# Patient Record
Sex: FEMALE | Race: AMERICAN INDIAN OR ALASKA NATIVE
[De-identification: names, ages, dates, MRNs, and addresses within clinical notes are randomized per-mention and may not be internally consistent; named-entity substitution may affect disease eponyms.]

---

## 2018-06-28 ENCOUNTER — HOSPITAL ENCOUNTER (OUTPATIENT)
Dept: HOSPITAL 5 - 3A | Age: 33
Setting detail: OBSERVATION
LOS: 4 days | Discharge: HOME | End: 2018-07-02
Attending: OBSTETRICS & GYNECOLOGY | Admitting: OBSTETRICS & GYNECOLOGY
Payer: MEDICAID

## 2018-06-28 DIAGNOSIS — Z82.49: ICD-10-CM

## 2018-06-28 DIAGNOSIS — Z79.899: ICD-10-CM

## 2018-06-28 LAB
ALBUMIN SERPL-MCNC: 3.6 G/DL (ref 3.9–5)
ALT SERPL-CCNC: 20 UNITS/L (ref 7–56)
BASOPHILS # (AUTO): 0.1 K/MM3 (ref 0–0.1)
BASOPHILS NFR BLD AUTO: 0.8 % (ref 0–1.8)
BILIRUB UR QL STRIP: (no result)
BLOOD UR QL VISUAL: (no result)
BUN SERPL-MCNC: 10 MG/DL (ref 7–17)
BUN/CREAT SERPL: 13 %
CALCIUM SERPL-MCNC: 9 MG/DL (ref 8.4–10.2)
EOSINOPHIL # BLD AUTO: 0.1 K/MM3 (ref 0–0.4)
EOSINOPHIL NFR BLD AUTO: 1.3 % (ref 0–4.3)
HCT VFR BLD CALC: 34.6 % (ref 30.3–42.9)
HEMOLYSIS INDEX: 91
HGB BLD-MCNC: 10.7 GM/DL (ref 10.1–14.3)
LYMPHOCYTES # BLD AUTO: 2.2 K/MM3 (ref 1.2–5.4)
LYMPHOCYTES NFR BLD AUTO: 28.7 % (ref 13.4–35)
MCH RBC QN AUTO: 22 PG (ref 28–32)
MCHC RBC AUTO-ENTMCNC: 31 % (ref 30–34)
MCV RBC AUTO: 70 FL (ref 79–97)
MONOCYTES # (AUTO): 0.6 K/MM3 (ref 0–0.8)
MONOCYTES % (AUTO): 7.7 % (ref 0–7.3)
PH UR STRIP: 7 [PH] (ref 5–7)
PLATELET # BLD: 254 K/MM3 (ref 140–440)
PROT UR STRIP-MCNC: (no result) MG/DL
RBC # BLD AUTO: 4.97 M/MM3 (ref 3.65–5.03)
RBC #/AREA URNS HPF: 1 /HPF (ref 0–6)
UROBILINOGEN UR-MCNC: < 2 MG/DL (ref ?–2)
WBC #/AREA URNS HPF: 1 /HPF (ref 0–6)

## 2018-06-28 PROCEDURE — 36415 COLL VENOUS BLD VENIPUNCTURE: CPT

## 2018-06-28 PROCEDURE — 96361 HYDRATE IV INFUSION ADD-ON: CPT

## 2018-06-28 PROCEDURE — 93306 TTE W/DOPPLER COMPLETE: CPT

## 2018-06-28 PROCEDURE — 96366 THER/PROPH/DIAG IV INF ADDON: CPT

## 2018-06-28 PROCEDURE — 96365 THER/PROPH/DIAG IV INF INIT: CPT

## 2018-06-28 PROCEDURE — 83735 ASSAY OF MAGNESIUM: CPT

## 2018-06-28 PROCEDURE — 81001 URINALYSIS AUTO W/SCOPE: CPT

## 2018-06-28 PROCEDURE — G0378 HOSPITAL OBSERVATION PER HR: HCPCS

## 2018-06-28 PROCEDURE — 96375 TX/PRO/DX INJ NEW DRUG ADDON: CPT

## 2018-06-28 PROCEDURE — 96376 TX/PRO/DX INJ SAME DRUG ADON: CPT

## 2018-06-28 PROCEDURE — G0379 DIRECT REFER HOSPITAL OBSERV: HCPCS

## 2018-06-28 PROCEDURE — 80053 COMPREHEN METABOLIC PANEL: CPT

## 2018-06-28 PROCEDURE — 85025 COMPLETE CBC W/AUTO DIFF WBC: CPT

## 2018-06-28 RX ADMIN — LABETALOL HYDROCHLORIDE SCH MG: 200 TABLET, FILM COATED ORAL at 21:59

## 2018-06-28 RX ADMIN — DEXTROSE, SODIUM CHLORIDE, SODIUM LACTATE, POTASSIUM CHLORIDE, AND CALCIUM CHLORIDE SCH MLS/HR: 5; .6; .31; .03; .02 INJECTION, SOLUTION INTRAVENOUS at 15:03

## 2018-06-28 RX ADMIN — LABETALOL HYDROCHLORIDE SCH MG: 200 TABLET, FILM COATED ORAL at 14:34

## 2018-06-28 NOTE — HISTORY AND PHYSICAL REPORT
History of Present Illness


Date of examination: 18


Date of admission: 





18


Chief complaint: 





here for post op


History of present illness: 


Pt presented to office for post op f/u after c/s. She had bps that were 180s/

100s. Pt admitted for evaluation and treatment of her elevated blood pressures. 

No headaches and no blurry vision.





Past History


Past Medical History: no pertinent history


Past Surgical History:  section (x2)





- Obstetrical History


: 3


Para: 3


Number of Living Children: 4





Medications and Allergies


 Allergies











Allergy/AdvReac Type Severity Reaction Status Date / Time


 


No Known Allergies Allergy   Unverified 18 14:20











 Home Medications











 Medication  Instructions  Recorded  Confirmed  Last Taken  Type


 


Docusate Sodium [Colace] 100 mg PO BID PRN #30 capsule 18  Unknown Rx


 


Ferrous Sulfate 325 mg PO BID #60 tablet.dr 18  Unknown Rx


 


Ibuprofen 800 mg PO Q6HR #30 tablet 18  Unknown Rx


 


Lidocain2.5%/Prilocai2.5% [Emla] 2 gm TP ONCE #1 tube 18  Unknown Rx


 


oxyCODONE /ACETAMINOPHEN [Percocet 1 tab PO Q4HR #30 tab 18  Unknown Rx





5/325]     











Active Meds: 


Active Medications





Acetaminophen (Tylenol)  650 mg PO Q4H PRN


   PRN Reason: Pain MILD(1-3)/Fever >100.5/HA


Magnesium Sulfate (Magnesium Sulfate 40gm/1000ml)  40 gm in 1,000 mls @ 50 mls/

hr IV AS DIRECT OWEN


Ibuprofen (Motrin)  600 mg PO Q6H PRN


   PRN Reason: Pain, Mild (1-3)


Ondansetron HCl (Zofran)  4 mg IV Q8H PRN


   PRN Reason: Nausea And Vomiting


Sodium Chloride (Sodium Chloride Flush Syringe 10 Ml)  10 ml IV BID OWEN


Sodium Chloride (Sodium Chloride Flush Syringe 10 Ml)  10 ml IV PRN PRN


   PRN Reason: LINE FLUSH











Review of Systems


All systems: negative





- Physical Exam


Breasts: Positive: deferred


Cardiovascular: Normal S1, Normal S2


Lungs: Positive: Clear to auscultation, Normal air movement


Abdomen: Positive: normal appearance, soft, other (incision well healed c/d/i).

  Negative: distention, tenderness, guarding


Genitourinary (Female): Positive: other (deferred)





Results


Result Diagrams: 


 18 14:20





 18 14:20


All other labs normal.








Assessment and Plan





- Patient Problems


(1) Hypertension, postpartum condition or complication


Current Visit: Yes   Status: Acute   


Plan to address problem: 


-admti


-magnesium sulfate drip for bp >160s/100s


-bp meds if indicated


-PIH labs

## 2018-06-29 RX ADMIN — LABETALOL HYDROCHLORIDE SCH MG: 200 TABLET, FILM COATED ORAL at 10:00

## 2018-06-29 RX ADMIN — DEXTROSE, SODIUM CHLORIDE, SODIUM LACTATE, POTASSIUM CHLORIDE, AND CALCIUM CHLORIDE SCH MLS/HR: 5; .6; .31; .03; .02 INJECTION, SOLUTION INTRAVENOUS at 02:14

## 2018-06-29 RX ADMIN — LABETALOL HYDROCHLORIDE SCH MG: 200 TABLET, FILM COATED ORAL at 21:42

## 2018-06-29 NOTE — PROGRESS NOTE
Assessment and Plan


 patient without complaints this morning, denies HA, visual changes or 

epigastric pain. b/p's 120/140's/70-80's. output adequate. Plan to continue mag 

sulfate x 24h (d/c this afternoon around 1500) and labetalol 200mg PO BID. Plan 

for d/c tomorrow if b/p remains stable. 








- Patient Problems


(1) Hypertension, postpartum condition or complication


Current Visit: Yes   Status: Acute   





Subjective





- Subjective


Date of service: 06/29/18


Principal diagnosis: postpartum readmit for pre-e; day 2


Patient reports: appetite normal, pain well controlled, no nauseated





Objective





- Vital Signs


Latest vital signs: 


 Vital Signs











  Temp Pulse Resp BP BP Pulse Ox


 


 06/29/18 02:40  98.6 F  80  18   145/79 


 


 06/29/18 01:00  98.4 F  87  16   149/79 


 


 06/28/18 22:25  98.2 F  102 H  18   128/79 


 


 06/28/18 21:59   86   136/84  


 


 06/28/18 20:25  98.7 F  97 H  18   126/70  98


 


 06/28/18 20:15  98.7 F  97 H  18   126/70  98


 


 06/28/18 18:24  98.2 F  97 H  16  135/84   98


 


 06/28/18 16:46  98.6 F  65  20  182/86   100


 


 06/28/18 15:14   53 L   179/95  


 


 06/28/18 14:34   57 L   188/107  


 


 06/28/18 13:20  99.0 F  57 L  14   188/107  99








 Intake and Output











 06/28/18 06/29/18 06/29/18





 23:59 07:59 15:59


 


Intake Total 240 1018.75 


 


Output Total 1200 700 


 


Balance -960 318.75 


 


Intake:   


 


  IV  838.75 


 


    D5lr 1,000 ml @ 125 mls/  838.75 





    hr IV AS DIRECT OWEN Rx#:   





    [id number]   


 


  Oral 240  


 


  Intake, Free Water  180 


 


Output:   


 


  Urine 1200 700 


 


    Indwelling Catheter 1200 700 


 


Other:   


 


  Total, Intake Amount 240  


 


  Total, Output Amount 300 700 














- Exam


Breasts: Present: normal


Cardiovascular: Present: Regular rate


Lungs: Present: Clear to auscultation, Normal air movement


Abdomen: Present: normal appearance, soft


Extremities: Present: normal


Deep Tendon Reflex Grade: Normal +2





- Labs


Labs: 


 Abnormal lab results











  06/28/18 06/28/18 06/28/18 Range/Units





  14:20 14:20 20:47 


 


MCV  70 L    (79-97)  fl


 


MCH  22 L    (28-32)  pg


 


RDW  18.8 H    (13.2-15.2)  %


 


Mono % (Auto)  7.7 H    (0.0-7.3)  %


 


Magnesium    3.20 H  (1.7-2.3)  mg/dL


 


Albumin   3.6 L   (3.9-5)  g/dL














  06/29/18 Range/Units





  01:51 


 


MCV   (79-97)  fl


 


MCH   (28-32)  pg


 


RDW   (13.2-15.2)  %


 


Mono % (Auto)   (0.0-7.3)  %


 


Magnesium  4.00 H  (1.7-2.3)  mg/dL


 


Albumin   (3.9-5)  g/dL

## 2018-06-30 RX ADMIN — LABETALOL HYDROCHLORIDE SCH MG: 100 TABLET, FILM COATED ORAL at 08:52

## 2018-06-30 RX ADMIN — LABETALOL HYDROCHLORIDE SCH MG: 100 TABLET, FILM COATED ORAL at 21:45

## 2018-06-30 RX ADMIN — LABETALOL HYDROCHLORIDE SCH: 100 TABLET, FILM COATED ORAL at 14:10

## 2018-06-30 NOTE — EVENT NOTE
Date: 06/30/18





Late entry: Manual BP by me @0825 200/100 at 0825 today; then had 197/99 with 

the automated cuff at 0830 today. Also Labetalol changed to 300mg q8h.

## 2018-06-30 NOTE — PROGRESS NOTE
Assessment and Plan





- Patient Problems


(1) Hypertension, postpartum condition or complication


Current Visit: Yes   Status: Acute   


Plan to address problem: 


s/p MgSO4 fro 24hours


Now BP's elevated (see RN note) 


Will increase Labetalol to 300mg BID to start now, Hydralazine 10mg IV now. 








(2)  delivery delivered


Current Visit: No   Status: Acute   


Plan to address problem: 


POD#12








(3) Encounter for sterilization


Current Visit: No   Status: Acute   


Plan to address problem: 


POD#12








(4) Previous  section


Current Visit: No   Status: Chronic   





Subjective





- Subjective


Date of service: 18


Principal diagnosis: HD#2 PP preEclampsia vs GHTN, POD# 12 RC/S


Interval history: 





Patient w/o complaints. Denies visual changes, HA, RUQ pain, no CP or SOB or 

any deficits


Patient reports: appetite normal, voiding normally





Objective





- Vital Signs


Latest vital signs: 


 Vital Signs











  Temp Pulse Resp BP BP BP Pulse Ox


 


 18 08:12      168/98  


 


 18 07:30  98.5 F  52 L  18   199/102   100


 


 18 06:09      140/84  


 


 18 01:04      140/84  150/88 


 


 18 00:00  98.5 F  66    179/85  


 


 18 21:42   88   150/88   


 


 18 20:00  98.5 F  69  20    141/78  96


 


 18 16:06  98.5 F  78  20    138/88  99


 


 18 12:06  97.6 F  87  20    138/81  97


 


 18 10:02  98.3 F  77  20    147/91  99


 


 18 10:00   79   136/84   








 Intake and Output











 18





 22:59 06:59 14:59


 


Intake Total  120 


 


Output Total 200 300 


 


Balance -200 -180 


 


Intake:   


 


  Oral  120 


 


Output:   


 


  Urine 200 300 


 


    Void 200 300 


 


Other:   


 


  Total, Intake Amount  120 


 


  Total, Output Amount 200 300 














- Exam


Breasts: Present: deferred


Cardiovascular: Present: Regular rate


Abdomen: Present: normal appearance





- Labs


Labs: 


 Abnormal lab results











  18 Range/Units





  10:48 14:04 


 


Magnesium  6.00 H  6.10 H  (1.7-2.3)  mg/dL

## 2018-06-30 NOTE — EVENT NOTE
Date: 06/30/18





patient still w/o complaints


/90 15minutes after Hydralazine


/80 at ~930


Will change labetalol to q8hrs and check bp's at q2hrs x4

## 2018-07-01 RX ADMIN — LABETALOL HYDROCHLORIDE SCH MG: 100 TABLET, FILM COATED ORAL at 05:21

## 2018-07-01 RX ADMIN — LABETALOL HYDROCHLORIDE SCH MG: 100 TABLET, FILM COATED ORAL at 22:20

## 2018-07-01 RX ADMIN — AMLODIPINE BESYLATE SCH MG: 10 TABLET ORAL at 16:07

## 2018-07-01 RX ADMIN — LABETALOL HYDROCHLORIDE SCH MG: 100 TABLET, FILM COATED ORAL at 14:02

## 2018-07-01 NOTE — CONSULTATION
History of Present Illness





- Reason for Consult


Consult date: 18


uncontrolled BP





- History of Present Illness





This is a 31 y/o female with h/o recent  presented to obg/gyn office 

for post op f/u. She noted to have BP of 180s/100s in the office. Pt was 

admitted for evaluation and treatment of her elevated blood pressures. She 

denies any headaches and blurry vision. She has been placed on labetalol 300mg 

q8h and also given MgSO4 drip. Medicine service requested for further 

evaluation and management. 





Past History


Past Medical History: No medical history


Past Surgical History: 


Social history: lives with family.  denies: smoking


Family history: hypertension





Medications and Allergies


 Allergies











Allergy/AdvReac Type Severity Reaction Status Date / Time


 


No Known Allergies Allergy   Unverified 18 14:20











 Home Medications











 Medication  Instructions  Recorded  Confirmed  Last Taken  Type


 


Docusate Sodium [Colace] 100 mg PO BID PRN #30 capsule 18 

Unknown Rx


 


Ferrous Sulfate 325 mg PO BID #60 tablet. 18 Unknown Rx


 


Ibuprofen 800 mg PO Q6HR #30 tablet 18 Unknown Rx


 


Lidocain2.5%/Prilocai2.5% [Emla] 2 gm TP ONCE #1 tube 18 Unknown 

Rx


 


oxyCODONE /ACETAMINOPHEN [Percocet 1 tab PO Q4HR #30 tab 18 

Unknown Rx





5/325]     


 


Labetalol [Normodyne TAB] 200 mg PO BID #60 tablet 18  Unknown Rx


 


Amlodipine Besylate [Norvasc] 10 mg PO Q24HR #90 tablet 18  Unknown Rx


 


Labetalol [Normodyne TAB] 300 mg PO Q8H #90 tablet 18  Unknown Rx











Active Meds: 


Active Medications





Acetaminophen (Tylenol)  650 mg PO Q4H PRN


   PRN Reason: Pain MILD(1-3)/Fever >100.5/HA


Dextrose/Lactated Ringer's (D5lr)  1,000 mls @ 125 mls/hr IV AS DIRECT OWEN


   Last Admin: 18 02:14 Dose:  75 mls/hr


Ibuprofen (Motrin)  600 mg PO Q6H PRN


   PRN Reason: Pain, Mild (1-3)


Labetalol HCl (Normodyne)  300 mg PO Q8HR [facility]


   Last Admin: 18 05:21 Dose:  300 mg


Ondansetron HCl (Zofran)  4 mg IV Q8H PRN


   PRN Reason: Nausea And Vomiting


Sodium Chloride (Sodium Chloride Flush Syringe 10 Ml)  10 ml IV BID [facility]


Sodium Chloride (Sodium Chloride Flush Syringe 10 Ml)  10 ml IV PRN PRN


   PRN Reason: LINE FLUSH











Review of Systems


Constitutional: no weight loss, no anorexia


Ears, nose, mouth and throat: no decreased hearing, no nasal congestion, no 

sinus pain, no bleeding gums, no headache, no vertigo


Breasts: normal, no swelling


Cardiovascular: no chest pain, no rapid/irregular heart beat, no edema, no 

lightheadedness, no shortness of breath


Respiratory: no cough, no shortness of breath, no congestion, no wheezing


Gastrointestinal: no abdominal pain, no vomiting


Genitourinary Female: no pelvic pain, no dysuria


Musculoskeletal: no neck stiffness


Integumentary: no rash, no jaundice


Neurological: no weakness, no parathesias, no numbness, no tingling


Psychiatric: no anxiety, no disorientation


Endocrine: no cold intolerance, no heat intolerance


Hematologic/Lymphatic: no easy bruising, no easy bleeding


Allergic/Immunologic: no urticaria





Exam





- Constitutional


Vitals: 


 











Temp Pulse Resp BP Pulse Ox


 


 98.0 F   74   18   158/84   100 


 


 18 07:23  18 07:23  18 07:23  18 08:40  18 07:23











General appearance: Present: no acute distress, obese





- EENT


Eyes: Present: PERRL


ENT: hearing intact, clear oral mucosa





- Neck


Neck: Present: supple, normal ROM





- Respiratory


Respiratory effort: normal


Respiratory: bilateral: CTA





- Cardiovascular


Heart Sounds: Present: S1 & S2.  Absent: rub, click





- Extremities


Extremities: pulses symmetrical, No edema


Peripheral Pulses: within normal limits





- Abdominal


General gastrointestinal: Present: soft, non-tender, non-distended, normal 

bowel sounds





- Integumentary


Integumentary: Present: clear, warm, dry





- Musculoskeletal


Musculoskeletal: gait normal, strength equal bilaterally





- Psychiatric


Psychiatric: appropriate mood/affect, intact judgment & insight





- Neurologic


Neurologic: CNII-XII intact, moves all extremities





Results





- Labs


CBC & Chem 7: 


 18 14:20





 18 14:20





Assessment and Plan





Uncontrolled BP


h/o recent delivery with C- section





- cont labatelol


- will add norvasc, obtain 2d echo


- Post-op care by primary

## 2018-07-01 NOTE — PROGRESS NOTE
Assessment and Plan


patient resting without complaints, denies HA/visual changes/epigastric pain. b/

p's reviewed with Dr. Brewer. Order placed in chart by Dr. Brewer for 

hospitalist consult and the hospitalist has been notified. Patient aware that 

she needs to be evaluated by hospitalist and no plan for d/c prior to being 

evaluated. Patient attributes elevated b/p's documented by dinamap to 

"unbearable pain" and states it feels like her "arm is going to explode". Nurse 

instructed to only check b/p's with manual cuff. Continue current pathway, 

awaiting hospitalist recommendations. Tentative appointment scheduled with Dr. Brewer in [place] office Tuesday @ 1030.








- Patient Problems


(1) Hypertension, postpartum condition or complication


Current Visit: Yes   Status: Acute   





Subjective





- Subjective


Date of service: 07/01/18


Principal diagnosis: HD#3 PP preEclampsia vs GHTN, POD# 13 RC/S


Patient reports: appetite normal, voiding normally, pain well controlled, 

ambulating normally, no dizzy ambulation, no nauseated





Objective





- Vital Signs


Latest vital signs: 


 Vital Signs











  Temp Pulse Resp BP BP BP Pulse Ox


 


 07/01/18 08:40       158/84 


 


 07/01/18 07:23  98.0 F  74  18  143/101    100


 


 07/01/18 05:21   90   150/90   


 


 07/01/18 04:05  98.2 F  86  18    140/90 


 


 07/01/18 01:13  98.6 F  73  18   152/80   99


 


 06/30/18 21:45   82   140/82   


 


 06/30/18 21:43  98.5 F  82  18   140/82  


 


 06/30/18 15:50  99.0 F  73  20   137/86   98


 


 06/30/18 14:10   90   108/59   


 


 06/30/18 13:31  98.6 F  90  20    108/59  98


 


 06/30/18 11:35  98.6 F  63  20   161/87   98








 Intake and Output











 06/30/18 07/01/18 07/01/18





 23:59 07:59 15:59


 


Intake Total 240  


 


Balance 240  


 


Intake:   


 


  Oral 240  


 


Other:   


 


  Total, Intake Amount 240  


 


  Voiding Method Toilet  


 


  # Voids   


 


    Indwelling Catheter 1  


 


  # Bowel Movements 0  














- Exam


Cardiovascular: Present: Regular rate


Lungs: Present: Clear to auscultation, Normal air movement


Abdomen: Present: normal appearance, soft


Uterus: Present: normal, firm, fundal height below umbilicus


Incision: Present: normal, dry, intact

## 2018-07-02 VITALS — DIASTOLIC BLOOD PRESSURE: 97 MMHG | SYSTOLIC BLOOD PRESSURE: 130 MMHG

## 2018-07-02 RX ADMIN — LABETALOL HYDROCHLORIDE SCH MG: 100 TABLET, FILM COATED ORAL at 17:12

## 2018-07-02 RX ADMIN — LABETALOL HYDROCHLORIDE SCH MG: 100 TABLET, FILM COATED ORAL at 05:32

## 2018-07-02 RX ADMIN — AMLODIPINE BESYLATE SCH MG: 10 TABLET ORAL at 10:33

## 2018-07-02 NOTE — DISCHARGE SUMMARY
Providers





- Providers


Date of Admission: 


06/28/18 13:12





Date of discharge: 07/02/18 (pt desires d/c home today; pt has appt in office 

tomorrow for BP check)


Attending physician: 


CAROLINA ROMANO





 





07/01/18 09:13


Consult to Physician [CONS] Routine 


   Comment: s/w Dr. Dwyer who will call 


   Consulting Provider: LANE DIMAS


   Physician Instructions: 


   Reason For Exam: elevated BP's











Primary care physician: 


CAROLINA ROMANO








Hospitalization


Reason for admission: other (pt admitted for elevated blood pressures PP)


Procedure: other (ECHO tiki for this AM)


Other postpartum procedures: other (received MGSO4 X 24 hours)


Hospital course: 


postpartum readmit for uncontrolled elevated blood pressure


Hospitalists consulted for BP control





Pt in good spirits No c/o HA, blurred vision, CP. -150/80-90 Pt is 

receiving Labetalol 300 TID and Norvasc 10mg QD DTRs wnl Pt stabel s/p admit 

for BP control PP. P: d/c today after ECHO and release by hospitalist. Will 

consult with  for d/c  Pt does have an appt in our office tomorrow 

for BP check.





Condition at discharge: Good


Disposition: DC-01 TO HOME OR SELFCARE





- Discharge Diagnoses


(1) Hypertension, postpartum condition or complication


Status: Acute   Comment: appt for tomorrow   





Plan





- Discharge Medications


Prescriptions: 


Amlodipine Besylate [Norvasc] 10 mg PO Q24HR #90 tablet


Labetalol [Normodyne TAB] 200 mg PO BID #60 tablet


Labetalol [Normodyne TAB] 300 mg PO Q8H #90 tablet





- Provider Discharge Summary


Activity: routine, no sex for 6 weeks, no heavy lifting 4 weeks, no strenuous 

exercise


Diet: other (no salt)


Instructions: routine


Additional instructions: 


[]  Smoking cessation referral if applicable(refer to patient education folder 

for contact #)


[]  Refer to [facility] Booklet








Call your doctor immediately for:


* Fever > 100.5


* Heavy vaginal bleeding ( >1 pad per hour)


* Severe persistent headache


* Shortness of breath


* Reddened, hot, painful area to leg or breast


* Drainage or odor from incision.





* Keep incision clean and dry at all times and follow doctor's instructions 

regarding bathing/showering











- Follow up plan


Follow up: 


CAROLINA ROMANO MD [Primary Care Provider] - 07/03/18 10:30 am


(Please call [phone] with any complaint of headache, blurred vision, chest 

pain. 


Take all medications as precribed.


Call with any concerns.)

## 2018-07-02 NOTE — PROGRESS NOTE
Assessment and Plan





Uncontrolled BP


h/o recent delivery with C- section





- cont labatelol and norvasc, follow 2d echo


- Post-op care by primary


- BP much stable today. Can d/c home if 2d echo normal











Subjective


Date of service: 07/02/18


Principal diagnosis: HD#3 PP preEclampsia vs GHTN, POD# 13 RC/S


Interval history: 





Pt seen and examined


No headache, chest pain or dizziness











Objective





- Exam


Narrative Exam: 











General appearance: Present: no acute distress, obese





- EENT


Eyes: Present: PERRL


ENT: hearing intact, clear oral mucosa





- Neck


Neck: Present: supple, normal ROM





- Respiratory


Respiratory effort: normal


Respiratory: bilateral: CTA





- Cardiovascular


Heart Sounds: Present: S1 & S2.  Absent: rub, click





- Extremities


Extremities: pulses symmetrical, No edema


Peripheral Pulses: within normal limits





- Abdominal


General gastrointestinal: Present: soft, non-tender, non-distended, normal 

bowel sounds





- Integumentary


Integumentary: Present: clear, warm, dry





- Musculoskeletal


Musculoskeletal: gait normal, strength equal bilaterally





- Psychiatric


Psychiatric: appropriate mood/affect, intact judgment & insight





- Neurologic


Neurologic: CNII-XII intact, moves all extremities














- Constitutional


Vitals: 


 Vital Signs - 12hr











  07/01/18 07/01/18 07/01/18





  22:20 23:17 23:20


 


Temperature  98.1 F 


 


Pulse Rate 71 66 66


 


Respiratory  16 16





Rate   


 


Blood Pressure 156/95 151/91 141/77


 


Blood Pressure  151/91 





[Left]   


 


Blood Pressure   





[Right]   


 


O2 Sat by Pulse  100 100





Oximetry   














  07/02/18 07/02/18 07/02/18





  03:29 03:33 05:32


 


Temperature 98.7 F 98.7 F 


 


Pulse Rate 78 81 81


 


Respiratory 16 16 





Rate   


 


Blood Pressure 163/89 139/83 139/83


 


Blood Pressure   





[Left]   


 


Blood Pressure   





[Right]   


 


O2 Sat by Pulse 98 98 





Oximetry   














  07/02/18





  06:00


 


Temperature 98.7 F


 


Pulse Rate 81


 


Respiratory 





Rate 


 


Blood Pressure 


 


Blood Pressure 





[Left] 


 


Blood Pressure 139/83





[Right] 


 


O2 Sat by Pulse 





Oximetry 














- Labs


CBC & Chem 7: 


 06/28/18 14:20





 06/28/18 14:20

## 2020-07-14 ENCOUNTER — VIRTUAL VISIT (OUTPATIENT)
Dept: NEUROLOGY | Age: 35
End: 2020-07-14

## 2020-07-14 DIAGNOSIS — G44.40 REBOUND HEADACHE: ICD-10-CM

## 2020-07-14 DIAGNOSIS — G43.009 MIGRAINE WITHOUT AURA AND WITHOUT STATUS MIGRAINOSUS, NOT INTRACTABLE: Primary | ICD-10-CM

## 2020-07-14 RX ORDER — AMITRIPTYLINE HYDROCHLORIDE 10 MG/1
10 TABLET, FILM COATED ORAL
Qty: 90 TAB | Refills: 1 | Status: SHIPPED | OUTPATIENT
Start: 2020-07-14 | End: 2022-06-20 | Stop reason: ALTCHOICE

## 2020-07-14 NOTE — PROGRESS NOTES
Neurology Consult Note      HISTORY PROVIDED BY: patient  Faisal Jaimes is a 30 y.o. female who was seen by synchronous (real-time) audio-video technology on 7/14/2020. Two factor identification completed. Consent:  She and/or her healthcare decision maker is aware that this patient-initiated Telehealth encounter is a billable service, with coverage as determined by her insurance carrier. She is aware that she may receive a bill and has provided verbal consent to proceed: Yes    I was in the office while conducting this encounter. I discussed with the patient the nature of our telemedicine visit: Our sessions are not being recorded and personal health information is protected. We will provide follow up care in person when the patient needs it. Pursuant to the emergency declaration under the [address] [place] [address], [facility] waiver authority and the [employer] Act, this Virtual  Visit was conducted, with patient's consent, to reduce the patient's risk of exposure to COVID-19. Chief Complaint:   Chief Complaint   Patient presents with    Headache      Subjective:    Faisal Jaimes is a 30 y.o. right handed female who presents in consultation for headaches and memory loss. Pt reports onset of HAs about a year ago, but not as frequent. She has had a stressful couple of years. Her PCP attributed sxs to her stress and made suggestions for improvement. Currently, HAs are near daily. Taking Excedrin daily which may or may not help. Pain is bifrontal pounding pain, 8/10 pain, +N, vomiting for the last 3 weeks but she is not certain why, +phonophobia. Also having ear pain and has ENT appt planned. No vision changes. No family h/o MHAs. She has noticed short term memory difficulties, in last year. Has not gotten lost driving. May forget appts, numbers, has trouble coming up with specific complaints.   MGM and maternal uncle have vascular dementia. Has been on Celexa for depression for last year, Rx ran out 3-4 months ago. . Take melatonin to sleep, unaware of snoring. Drinks 4-5 bottles of water a day. Drinks Red Bulls daily, 3-4 a day and will have diet coke or coke zero. .     Past Medical History:   Diagnosis Date    Depression       Past Surgical History:   Procedure Laterality Date    BREAST SURGERY PROCEDURE UNLISTED      left breast cyst resection      Social History     Socioeconomic History    Marital status: SINGLE     Spouse name: Not on file    Number of children: Not on file    Years of education: Not on file    Highest education level: Not on file   Occupational History    Occupation: Unemployed, warehouse work   Social Needs    Financial resource strain: Not on file    Food insecurity     Worry: Not on file     Inability: Not on file   [employer] needs     Medical: Not on file     Non-medical: Not on file   Tobacco Use    Smoking status: Current Every Day Smoker     Packs/day: 0.50     Years: 12.00     Pack years: 6.00    Smokeless tobacco: Never Used   Substance and Sexual Activity    Alcohol use:  Yes     Alcohol/week: 2.0 standard drinks     Types: 2 Shots of liquor per week     Comment: 14+drinks Q week    Drug use: Not Currently    Sexual activity: Not on file   Lifestyle    Physical activity     Days per week: Not on file     Minutes per session: Not on file    Stress: Not on file   Relationships    Social connections     Talks on phone: Not on file     Gets together: Not on file     Attends Gnosticist service: Not on file     Active member of club or organization: Not on file     Attends meetings of clubs or organizations: Not on file     Relationship status: Not on file    Intimate partner violence     Fear of current or ex partner: Not on file     Emotionally abused: Not on file     Physically abused: Not on file     Forced sexual activity: Not on file   Other Topics Concern    Not on file Social History Narrative    Lives in [place] with Mom     Family History   Problem Relation Age of Onset    Heart Disease Mother         Pacemaker    Arthritis Mother     Cancer Maternal Aunt     Dementia Maternal Uncle     Dementia Maternal Grandmother     Cancer Maternal Grandfather     Other Father         Estranged    No Known Problems Sister     No Known Problems Brother     No Known Problems Sister          Objective:   ROS    Not on File     Meds:  No outpatient medications prior to visit. No facility-administered medications prior to visit. Imaging:  MRI Results (most recent):  No results found for this or any previous visit. CT Results (most recent):  No results found for this or any previous visit. Reviewed records in [facility] tab today    Lab Review   No results found for this or any previous visit. Limited exam due to VV  Exam:  There were no vitals taken for this visit. Patient-Reported Vitals 7/14/2020   Patient-Reported Weight 200lb   Patient-Reported Height 5f6      General:  Alert, cooperative, no distress. Head:  Normocephalic, without obvious abnormality, atraumatic. Respiratory:  Heart:   Non labored breathing     Neck:      Extremities:    Pulses:        Neurologic:  MS: Alert and oriented x 4, speech intact. Language intact. Attention and fund of knowledge appropriate. Recent and remote memory intact.   Cranial Nerves:  II: visual fields    II: pupils Equal, round   II: optic disc    III,VII: ptosis none   III,IV,VI: extraocular muscles  EOMI, no nystagmus or diplopia   V: facial light touch sensation     VII: facial muscle function   symmetric   VIII: hearing intact   IX: soft palate elevation  normal   XI: trapezius strength  Symmetric   XI: sternocleidomastoid strength    XII: tongue  Midline     Motor:  no tremor, able to stand on one leg bilaterally, no PD  Sensory:   Coordination: FTN and KEM intact  Gait:   Reflexes: Assessment/Plan   Pt is a 30 y.o. right handed female with tobacco abuse and depression with onset of HAs about a year ago, increasing in frequency, now daily taking daily Excedrin. Pain is bifrontal pounding 8/10 pain, +N, vomiting for the last 3 weeks but she is not certain why, +phonophobia. No vision changes. Additionally, c/o short term memory difficulties in last year, just not remembering as well as she used to. Does have family h/o vascular dementia in  [facility] and maternal uncle. Exam is limited due to VV, but appears non-focal.  Possible migraine headaches, but also has rebound headaches from excessive daily caffeine intake and daily Excedrin. Recommend starting amitriptyline 10mg qhs for MHA prevention, side effects reviewed including positive side effect of sleepiness to help with sleep onset insomnia. Discussed rebound HAs, instructed to stop Excedrin and start weaning off of caffeine. HAs will worsen before they gradually improved. Excessive caffeine is also likely disrupting sleep. Discussed connection between sleep, pain, and mood. Low suspicion for concerning cause of memory complaints. Suspect mood/stress, poor sleep, and daily HAs likely causing difficulty. F/u in clinic in 3 months to reassess, if no improvement at that time, may need to consider MRI brain. Pt is instructed to call in the interim if needed. ICD-10-CM ICD-9-CM    1. Migraine without aura and without status migrainosus, not intractable  G43.009 346.10    2. Rebound headache  G44.40 339.3        Signed:   Jadon Rebolledo MD  7/14/2020

## 2020-07-14 NOTE — PROGRESS NOTES
Ms. Zeynep Bhatt is a new patient being evaluated via virtual visit for daily headaches for the past three months. Patient endorses nausea and vomiting with her headaches. Patient has also noticed a decrease in her short term memory over the past year. Patient may be reached at [phone].

## 2022-06-20 ENCOUNTER — OFFICE VISIT (OUTPATIENT)
Dept: INTERNAL MEDICINE CLINIC | Age: 37
End: 2022-06-20
Payer: COMMERCIAL

## 2022-06-20 VITALS
SYSTOLIC BLOOD PRESSURE: 136 MMHG | HEART RATE: 72 BPM | HEIGHT: 67 IN | WEIGHT: 232 LBS | DIASTOLIC BLOOD PRESSURE: 82 MMHG | BODY MASS INDEX: 36.41 KG/M2 | RESPIRATION RATE: 16 BRPM | OXYGEN SATURATION: 99 % | TEMPERATURE: 98.1 F

## 2022-06-20 DIAGNOSIS — Z11.59 NEED FOR HEPATITIS C SCREENING TEST: ICD-10-CM

## 2022-06-20 DIAGNOSIS — R23.4 BREAST SKIN CHANGES: ICD-10-CM

## 2022-06-20 DIAGNOSIS — E55.9 VITAMIN D DEFICIENCY: ICD-10-CM

## 2022-06-20 DIAGNOSIS — F17.200 CURRENT SMOKER: ICD-10-CM

## 2022-06-20 DIAGNOSIS — Z00.00 ENCOUNTER FOR MEDICAL EXAMINATION TO ESTABLISH CARE: Primary | ICD-10-CM

## 2022-06-20 DIAGNOSIS — Z12.4 PAP SMEAR FOR CERVICAL CANCER SCREENING: ICD-10-CM

## 2022-06-20 PROCEDURE — 99203 OFFICE O/P NEW LOW 30 MIN: CPT | Performed by: INTERNAL MEDICINE

## 2022-06-20 RX ORDER — BUPROPION HYDROCHLORIDE 150 MG/1
150 TABLET ORAL
Qty: 30 TABLET | Refills: 1 | Status: SHIPPED | OUTPATIENT
Start: 2022-06-20

## 2022-06-20 RX ORDER — LEVOCETIRIZINE DIHYDROCHLORIDE 5 MG/1
5 TABLET, FILM COATED ORAL DAILY
COMMUNITY

## 2022-06-20 NOTE — PROGRESS NOTES
HISTORY OF PRESENT ILLNESS  Kenyatta Jefferson is a 31 y.o. female. Patient was seen to establish care. Reports that she was seen at urgent care before. PMH of smoker. This is current and she is ready to stop. PAP was done years ago. No concerns in this area. Will also need lab work done. Reports that there are areas to her breast that are red spot like and then a lump too. This has happen over the last few months. Had a staph infection a few months back on the left breast. This looks better. Visit Vitals  /82 (BP 1 Location: Right upper arm, BP Patient Position: Sitting, BP Cuff Size: Adult)   Pulse 72   Temp 98.1 °F (36.7 °C) (Oral)   Resp 16   Ht 5' 7\" (1.702 m)   Wt 232 lb (105.2 kg)   LMP 06/16/2022   SpO2 99%   BMI 36.34 kg/m²     Past Medical History:   Diagnosis Date    Depression      Past Surgical History:   Procedure Laterality Date    HI BREAST SURGERY PROCEDURE UNLISTED      left breast cyst resection     Family History   Problem Relation Age of Onset    Heart Disease Mother         Pacemaker    Arthritis Mother     Dementia Maternal Uncle     Dementia Maternal Grandmother     Cancer Maternal Grandfather     Other Father         Estranged    No Known Problems Sister     No Known Problems Brother     No Known Problems Sister      Outpatient Encounter Medications as of 6/20/2022   Medication Sig Dispense Refill    levocetirizine (Xyzal) 5 mg tablet Take 5 mg by mouth daily.  buPROPion XL (WELLBUTRIN XL) 150 mg tablet Take 1 Tablet by mouth every morning. 30 Tablet 1    [DISCONTINUED] amitriptyline (ELAVIL) 10 mg tablet Take 1 Tab by mouth nightly. (Patient not taking: Reported on 6/20/2022) 90 Tab 1     No facility-administered encounter medications on file as of 6/20/2022. HPI    Review of Systems   Constitutional: Negative. Respiratory: Negative. Cardiovascular: Negative. Gastrointestinal: Negative. Skin:        breast changes    Neurological: Negative. Psychiatric/Behavioral: Negative. Physical Exam  Vitals and nursing note reviewed. Exam conducted with a chaperone present. Cardiovascular:      Rate and Rhythm: Normal rate and regular rhythm. Pulmonary:      Effort: Pulmonary effort is normal.      Breath sounds: Normal breath sounds. Chest:   Breasts:      Left: Mass: Left. Comments: Right breast, Red spot like areas, flat. Left breast with red area and pea size elevated lump, non tender   Abdominal:      General: Bowel sounds are normal.      Palpations: Abdomen is soft. Musculoskeletal:      Right lower leg: No edema. Left lower leg: No edema. Skin:     General: Skin is warm. Neurological:      Mental Status: She is alert. Psychiatric:         Behavior: Behavior normal.         ASSESSMENT and PLAN  Diagnoses and all orders for this visit:    1. Encounter for medical examination to establish care  -     METABOLIC PANEL, COMPREHENSIVE; Future  -     CBC WITH AUTOMATED DIFF; Future  -     TSH 3RD GENERATION; Future  -     LIPID PANEL; Future    2. Breast skin changes  -     US BREASTS COMPLETE; Future    3. Pap smear for cervical cancer screening  -     REFERRAL TO GYNECOLOGY    4. Current smoker  -     buPROPion XL (WELLBUTRIN XL) 150 mg tablet; Take 1 Tablet by mouth every morning. 5. Vitamin D deficiency  -     VITAMIN D, 25 HYDROXY; Future    6. Need for hepatitis C screening test  -     HEPATITIS C AB;  Future      Follow-up and Dispositions    · Return in about 6 months (around 12/20/2022), or if symptoms worsen or fail to improve.       lab results and schedule of future lab studies reviewed with patient  reviewed diet, exercise and weight control  reviewed medications and side effects in detail

## 2022-06-20 NOTE — PROGRESS NOTES
Health Maintenance Due   Topic Date Due    Hepatitis C Screening  Never done    Depression Screen  Never done    COVID-19 Vaccine (1) Never done    Pneumococcal 0-64 years (1 - PCV) Never done    DTaP/Tdap/Td series (1 - Tdap) Never done    Cervical cancer screen  Never done       Chief Complaint   Patient presents with    New Patient    Nicotine Dependence    Labs       1. Have you been to the ER, urgent care clinic since your last visit? Hospitalized since your last visit? No     2. Have you seen or consulted any other health care providers outside of the [address] [place] [address] since your last visit? Include any pap smears or colon screening. No    3) Do you have an Advance Directive on file? no    4) Are you interested in receiving information on Advance Directives? NO      Patient is accompanied by self I have received verbal consent from Nancy Gonzalez to discuss any/all medical information while they are present in the room.

## 2022-06-21 DIAGNOSIS — E55.9 VITAMIN D DEFICIENCY: Primary | ICD-10-CM

## 2022-06-21 LAB
25(OH)D3+25(OH)D2 SERPL-MCNC: 16.6 NG/ML (ref 30–100)
ALBUMIN SERPL-MCNC: 4.4 G/DL (ref 3.8–4.8)
ALBUMIN/GLOB SERPL: 2.1 {RATIO} (ref 1.2–2.2)
ALP SERPL-CCNC: 60 IU/L (ref 44–121)
ALT SERPL-CCNC: 14 IU/L (ref 0–32)
AST SERPL-CCNC: 18 IU/L (ref 0–40)
BASOPHILS # BLD AUTO: 0.1 X10E3/UL (ref 0–0.2)
BASOPHILS NFR BLD AUTO: 1 %
BILIRUB SERPL-MCNC: <0.2 MG/DL (ref 0–1.2)
BUN SERPL-MCNC: 9 MG/DL (ref 6–20)
BUN/CREAT SERPL: 12 (ref 9–23)
CALCIUM SERPL-MCNC: 9.2 MG/DL (ref 8.7–10.2)
CHLORIDE SERPL-SCNC: 104 MMOL/L (ref 96–106)
CHOLEST SERPL-MCNC: 204 MG/DL (ref 100–199)
CO2 SERPL-SCNC: 22 MMOL/L (ref 20–29)
CREAT SERPL-MCNC: 0.76 MG/DL (ref 0.57–1)
EGFR: 105 ML/MIN/1.73
EOSINOPHIL # BLD AUTO: 0.2 X10E3/UL (ref 0–0.4)
EOSINOPHIL NFR BLD AUTO: 3 %
ERYTHROCYTE [DISTWIDTH] IN BLOOD BY AUTOMATED COUNT: 16.1 % (ref 11.7–15.4)
GLOBULIN SER CALC-MCNC: 2.1 G/DL (ref 1.5–4.5)
GLUCOSE SERPL-MCNC: 85 MG/DL (ref 65–99)
HCT VFR BLD AUTO: 36.6 % (ref 34–46.6)
HCV AB S/CO SERPL IA: <0.1 S/CO RATIO (ref 0–0.9)
HDLC SERPL-MCNC: 44 MG/DL
HGB BLD-MCNC: 11.9 G/DL (ref 11.1–15.9)
IMM GRANULOCYTES # BLD AUTO: 0 X10E3/UL (ref 0–0.1)
IMM GRANULOCYTES NFR BLD AUTO: 0 %
IMP & REVIEW OF LAB RESULTS: NORMAL
LDLC SERPL CALC-MCNC: 136 MG/DL (ref 0–99)
LYMPHOCYTES # BLD AUTO: 2.1 X10E3/UL (ref 0.7–3.1)
LYMPHOCYTES NFR BLD AUTO: 29 %
MCH RBC QN AUTO: 24.5 PG (ref 26.6–33)
MCHC RBC AUTO-ENTMCNC: 32.5 G/DL (ref 31.5–35.7)
MCV RBC AUTO: 75 FL (ref 79–97)
MONOCYTES # BLD AUTO: 0.5 X10E3/UL (ref 0.1–0.9)
MONOCYTES NFR BLD AUTO: 7 %
NEUTROPHILS # BLD AUTO: 4.2 X10E3/UL (ref 1.4–7)
NEUTROPHILS NFR BLD AUTO: 60 %
PLATELET # BLD AUTO: 386 X10E3/UL (ref 150–450)
POTASSIUM SERPL-SCNC: 4.6 MMOL/L (ref 3.5–5.2)
PROT SERPL-MCNC: 6.5 G/DL (ref 6–8.5)
RBC # BLD AUTO: 4.86 X10E6/UL (ref 3.77–5.28)
SODIUM SERPL-SCNC: 141 MMOL/L (ref 134–144)
TRIGL SERPL-MCNC: 132 MG/DL (ref 0–149)
TSH SERPL DL<=0.005 MIU/L-ACNC: 1.57 UIU/ML (ref 0.45–4.5)
VLDLC SERPL CALC-MCNC: 24 MG/DL (ref 5–40)
WBC # BLD AUTO: 7.1 X10E3/UL (ref 3.4–10.8)

## 2022-06-22 RX ORDER — ERGOCALCIFEROL 1.25 MG/1
CAPSULE ORAL
Qty: 12 CAPSULE | Refills: 0 | Status: SHIPPED | OUTPATIENT
Start: 2022-06-22

## 2022-06-24 ENCOUNTER — TRANSCRIBE ORDER (OUTPATIENT)
Dept: MAMMOGRAPHY | Age: 37
End: 2022-06-24

## 2022-06-24 DIAGNOSIS — R23.4 BREAST SKIN CHANGES: ICD-10-CM

## 2022-06-24 DIAGNOSIS — N63.20 LUMP OF LEFT BREAST: ICD-10-CM

## 2022-06-24 DIAGNOSIS — N63.20 LUMP OF LEFT BREAST: Primary | ICD-10-CM

## 2022-06-24 DIAGNOSIS — N63.10 LUMP OF BREAST, RIGHT: Primary | ICD-10-CM

## 2022-06-24 DIAGNOSIS — N63.10 LUMP OF RIGHT BREAST: ICD-10-CM

## 2022-07-08 ENCOUNTER — HOSPITAL ENCOUNTER (OUTPATIENT)
Dept: MAMMOGRAPHY | Age: 37
Discharge: HOME | End: 2022-07-08
Attending: INTERNAL MEDICINE
Payer: COMMERCIAL

## 2022-07-08 DIAGNOSIS — N63.10 LUMP OF RIGHT BREAST: ICD-10-CM

## 2022-07-08 DIAGNOSIS — R23.4 BREAST SKIN CHANGES: ICD-10-CM

## 2022-07-08 DIAGNOSIS — N63.20 LUMP OF LEFT BREAST: ICD-10-CM

## 2022-07-08 DIAGNOSIS — N63.10 LUMP OF BREAST, RIGHT: ICD-10-CM

## 2022-07-08 PROCEDURE — 77062 BREAST TOMOSYNTHESIS BI: CPT

## 2022-07-08 PROCEDURE — 76642 ULTRASOUND BREAST LIMITED: CPT

## 2023-05-20 RX ORDER — LEVOCETIRIZINE DIHYDROCHLORIDE 5 MG/1
5 TABLET, FILM COATED ORAL DAILY
COMMUNITY

## 2023-05-20 RX ORDER — BUPROPION HYDROCHLORIDE 150 MG/1
1 TABLET ORAL EVERY MORNING
COMMUNITY
Start: 2022-06-20

## 2023-05-20 RX ORDER — ERGOCALCIFEROL 1.25 MG/1
CAPSULE ORAL
COMMUNITY
Start: 2022-06-22